# Patient Record
Sex: FEMALE | Race: WHITE
[De-identification: names, ages, dates, MRNs, and addresses within clinical notes are randomized per-mention and may not be internally consistent; named-entity substitution may affect disease eponyms.]

---

## 2018-12-12 ENCOUNTER — HOSPITAL ENCOUNTER (OUTPATIENT)
Dept: HOSPITAL 88 - MRI | Age: 78
End: 2018-12-12
Attending: PODIATRIST
Payer: MEDICARE

## 2018-12-12 DIAGNOSIS — M76.822: Primary | ICD-10-CM

## 2018-12-13 NOTE — DIAGNOSTIC IMAGING REPORT
TECHNIQUE:

Magnetic resonance imaging of the LEFT ANKLE was performed WITHOUT injected

contrast.

Motion artifact partially limits sensitivity and specificity of the exam. 



HISTORY: Pain and swelling, stepped long, 2 weeks ago 

COMPARISON: None available.



FINDINGS: 

LIGAMENTS:

Medial Complex:  Intact

Lateral Complex:  Intact, including the tibiofibular ligaments.



TENDONS:

Medial:  Intact

Lateral:  Intact

Anterior:  Intact

Achilles:  Intact



BONES:

No focal or infiltrative bone marrow replacing abnormality.  

No acute fracture or osteonecrosis.



JOINTS:

Cartilage:   No focal defect involving the tibiotalar joint.

Other:  Fluid within the joints is within physiologic limits.



SOFT TISSUES:  

Trace fluid in the retrocalcaneal bursa.

A lobulated 0.3  cm (AP) x  0.6  cm (ML) x  0.8  cm (CC) fluid intensity focus

at the distal tibiofibular syndesmosis (series 5 image 21).





IMPRESSION: 

1.  No acute fracture or ligamentous/tendon tear.

2.  Small ganglion cyst of the distal tibiofibular syndesmosis, likely the

sequela of a remote injury.

3.  Trace retrocalcaneal bursitis.



Signed by: Dr. Lucas Lara D.O., M.M.M. on 12/13/2018 8:38 AM